# Patient Record
Sex: FEMALE | Race: WHITE | NOT HISPANIC OR LATINO | URBAN - NONMETROPOLITAN AREA
[De-identification: names, ages, dates, MRNs, and addresses within clinical notes are randomized per-mention and may not be internally consistent; named-entity substitution may affect disease eponyms.]

---

## 2018-07-10 ENCOUNTER — FOLLOW UP ESTABLISHED (OUTPATIENT)
Dept: URBAN - NONMETROPOLITAN AREA CLINIC 15 | Facility: CLINIC | Age: 63
End: 2018-07-10
Payer: COMMERCIAL

## 2018-07-10 DIAGNOSIS — H43.392 OTHER VITREOUS OPACITIES, LEFT EYE: Primary | ICD-10-CM

## 2018-07-10 PROCEDURE — 92014 COMPRE OPH EXAM EST PT 1/>: CPT | Performed by: OPTOMETRIST

## 2018-07-10 ASSESSMENT — INTRAOCULAR PRESSURE
OS: 17
OD: 17

## 2019-02-13 ENCOUNTER — FOLLOW UP ESTABLISHED (OUTPATIENT)
Dept: URBAN - NONMETROPOLITAN AREA CLINIC 12 | Facility: CLINIC | Age: 64
End: 2019-02-13
Payer: COMMERCIAL

## 2019-02-13 DIAGNOSIS — H25.13 AGE-RELATED NUCLEAR CATARACT, BILATERAL: ICD-10-CM

## 2019-02-13 DIAGNOSIS — H35.3121 NEXDTVE AGE-RELATED MCLR DEGN, LEFT EYE, EARLY DRY STAGE: ICD-10-CM

## 2019-02-13 PROCEDURE — 92014 COMPRE OPH EXAM EST PT 1/>: CPT | Performed by: OPTOMETRIST

## 2019-02-27 ENCOUNTER — FOLLOW UP ESTABLISHED (OUTPATIENT)
Dept: URBAN - NONMETROPOLITAN AREA CLINIC 12 | Facility: CLINIC | Age: 64
End: 2019-02-27
Payer: COMMERCIAL

## 2019-02-27 DIAGNOSIS — H43.811 VITREOUS DEGENERATION, RIGHT EYE: Primary | ICD-10-CM

## 2019-02-27 PROCEDURE — 92012 INTRM OPH EXAM EST PATIENT: CPT | Performed by: OPTOMETRIST

## 2019-02-27 ASSESSMENT — INTRAOCULAR PRESSURE
OD: 20
OS: 19
OD: 21

## 2020-10-23 ENCOUNTER — FOLLOW UP ESTABLISHED (OUTPATIENT)
Dept: URBAN - NONMETROPOLITAN AREA CLINIC 12 | Facility: CLINIC | Age: 65
End: 2020-10-23
Payer: MEDICARE

## 2020-10-23 DIAGNOSIS — H02.832 DERMATOCHALASIS OF RIGHT LOWER LID: ICD-10-CM

## 2020-10-23 PROCEDURE — 92014 COMPRE OPH EXAM EST PT 1/>: CPT | Performed by: OPTOMETRIST

## 2020-10-23 ASSESSMENT — INTRAOCULAR PRESSURE
OS: 19
OD: 21

## 2020-10-23 ASSESSMENT — VISUAL ACUITY
OD: 20/20
OS: 20/25

## 2020-12-21 ENCOUNTER — FOLLOW UP ESTABLISHED (OUTPATIENT)
Dept: URBAN - NONMETROPOLITAN AREA CLINIC 12 | Facility: CLINIC | Age: 65
End: 2020-12-21
Payer: MEDICARE

## 2020-12-21 PROCEDURE — 92014 COMPRE OPH EXAM EST PT 1/>: CPT | Performed by: OPTOMETRIST

## 2020-12-21 RX ORDER — AZITHROMYCIN MONOHYDRATE 250 MG/1
250 MG TABLET, FILM COATED ORAL
Qty: 5 | Refills: 0 | Status: INACTIVE
Start: 2020-12-21 | End: 2020-12-25

## 2020-12-21 ASSESSMENT — INTRAOCULAR PRESSURE
OD: 24
OS: 27

## 2020-12-28 ENCOUNTER — FOLLOW UP ESTABLISHED (OUTPATIENT)
Dept: URBAN - NONMETROPOLITAN AREA CLINIC 12 | Facility: CLINIC | Age: 65
End: 2020-12-28
Payer: MEDICARE

## 2020-12-28 PROCEDURE — 92012 INTRM OPH EXAM EST PATIENT: CPT | Performed by: OPTOMETRIST

## 2020-12-28 RX ORDER — PREDNISOLONE 10 MG/1
10 MG TABLET, ORALLY DISINTEGRATING ORAL
Qty: 10 | Refills: 0 | Status: INACTIVE
Start: 2020-12-28 | End: 2020-12-28

## 2020-12-28 ASSESSMENT — INTRAOCULAR PRESSURE
OS: 23
OD: 22

## 2021-01-11 ENCOUNTER — FOLLOW UP ESTABLISHED (OUTPATIENT)
Dept: URBAN - NONMETROPOLITAN AREA CLINIC 12 | Facility: CLINIC | Age: 66
End: 2021-01-11
Payer: MEDICARE

## 2021-01-11 PROCEDURE — 92012 INTRM OPH EXAM EST PATIENT: CPT | Performed by: OPTOMETRIST

## 2021-01-11 ASSESSMENT — INTRAOCULAR PRESSURE
OS: 25
OD: 22

## 2021-01-19 ENCOUNTER — NEW PATIENT (OUTPATIENT)
Dept: URBAN - NONMETROPOLITAN AREA CLINIC 13 | Facility: CLINIC | Age: 66
End: 2021-01-19
Payer: MEDICARE

## 2021-01-19 DIAGNOSIS — H57.12 OCULAR PAIN, LEFT EYE: Primary | ICD-10-CM

## 2021-01-19 PROCEDURE — 92250 FUNDUS PHOTOGRAPHY W/I&R: CPT | Performed by: OPHTHALMOLOGY

## 2021-01-19 PROCEDURE — 92004 COMPRE OPH EXAM NEW PT 1/>: CPT | Performed by: OPHTHALMOLOGY

## 2021-01-19 ASSESSMENT — INTRAOCULAR PRESSURE
OS: 28
OD: 15

## 2023-01-04 NOTE — IMPRESSION/PLAN
Impression: Diplopia: H53.2. Plan: comes and and lasts a few seconds. Transient. Will continue to monitor for now.   If it gets worse, RTC.

## 2023-01-04 NOTE — IMPRESSION/PLAN
Impression: Hordeolum externum left upper eyelid: H00.014. Plan: Findings discussed with patient. symptoms are improving. Monitor for now.   RTC if any changes noted